# Patient Record
Sex: FEMALE | Race: WHITE | NOT HISPANIC OR LATINO | Employment: FULL TIME | URBAN - METROPOLITAN AREA
[De-identification: names, ages, dates, MRNs, and addresses within clinical notes are randomized per-mention and may not be internally consistent; named-entity substitution may affect disease eponyms.]

---

## 2022-06-13 LAB
HEPATITIS B SURFACE ANTIGEN (EXTERNAL): NEGATIVE
HIV1+2 AB SERPL QL IA: NEGATIVE
RUBELLA ANTIBODY IGG (EXTERNAL): NORMAL

## 2022-12-27 LAB — GROUP B STREPTOCOCCUS (EXTERNAL): NEGATIVE

## 2023-01-23 ENCOUNTER — ANESTHESIA (OUTPATIENT)
Dept: OBGYN | Facility: CLINIC | Age: 33
End: 2023-01-23
Payer: COMMERCIAL

## 2023-01-23 ENCOUNTER — HOSPITAL ENCOUNTER (INPATIENT)
Facility: CLINIC | Age: 33
LOS: 3 days | Discharge: HOME HEALTH | End: 2023-01-26
Attending: OBSTETRICS & GYNECOLOGY | Admitting: OBSTETRICS & GYNECOLOGY
Payer: COMMERCIAL

## 2023-01-23 ENCOUNTER — ANESTHESIA EVENT (OUTPATIENT)
Dept: OBGYN | Facility: CLINIC | Age: 33
End: 2023-01-23
Payer: COMMERCIAL

## 2023-01-23 DIAGNOSIS — Z34.90 ENCOUNTER FOR INDUCTION OF LABOR: ICD-10-CM

## 2023-01-23 LAB
ABO/RH(D): NORMAL
ALBUMIN MFR UR ELPH: <6 MG/DL (ref 1–14)
ALBUMIN SERPL BCG-MCNC: 3.7 G/DL (ref 3.5–5.2)
ALP SERPL-CCNC: 128 U/L (ref 35–104)
ALT SERPL W P-5'-P-CCNC: 15 U/L (ref 10–35)
ANION GAP SERPL CALCULATED.3IONS-SCNC: 13 MMOL/L (ref 7–15)
ANTIBODY SCREEN: NEGATIVE
AST SERPL W P-5'-P-CCNC: 21 U/L (ref 10–35)
BILIRUB SERPL-MCNC: 0.2 MG/DL
BUN SERPL-MCNC: 8 MG/DL (ref 6–20)
CALCIUM SERPL-MCNC: 8.4 MG/DL (ref 8.6–10)
CHLORIDE SERPL-SCNC: 103 MMOL/L (ref 98–107)
CREAT SERPL-MCNC: 0.47 MG/DL (ref 0.51–0.95)
CREAT UR-MCNC: 22.1 MG/DL
DEPRECATED HCO3 PLAS-SCNC: 20 MMOL/L (ref 22–29)
ERYTHROCYTE [DISTWIDTH] IN BLOOD BY AUTOMATED COUNT: 13.5 % (ref 10–15)
GFR SERPL CREATININE-BSD FRML MDRD: >90 ML/MIN/1.73M2
GLUCOSE SERPL-MCNC: 121 MG/DL (ref 70–99)
HCT VFR BLD AUTO: 35.9 % (ref 35–47)
HGB BLD-MCNC: 11.8 G/DL (ref 11.7–15.7)
MCH RBC QN AUTO: 29.9 PG (ref 26.5–33)
MCHC RBC AUTO-ENTMCNC: 32.9 G/DL (ref 31.5–36.5)
MCV RBC AUTO: 91 FL (ref 78–100)
PLATELET # BLD AUTO: 189 10E3/UL (ref 150–450)
POTASSIUM SERPL-SCNC: 3.7 MMOL/L (ref 3.4–5.3)
PROT SERPL-MCNC: 6.3 G/DL (ref 6.4–8.3)
PROT/CREAT 24H UR: NORMAL MG/G{CREAT}
RBC # BLD AUTO: 3.94 10E6/UL (ref 3.8–5.2)
SODIUM SERPL-SCNC: 136 MMOL/L (ref 136–145)
SPECIMEN EXPIRATION DATE: NORMAL
T PALLIDUM AB SER QL: NONREACTIVE
WBC # BLD AUTO: 10.9 10E3/UL (ref 4–11)

## 2023-01-23 PROCEDURE — 85027 COMPLETE CBC AUTOMATED: CPT | Performed by: OBSTETRICS & GYNECOLOGY

## 2023-01-23 PROCEDURE — 80053 COMPREHEN METABOLIC PANEL: CPT | Performed by: OBSTETRICS & GYNECOLOGY

## 2023-01-23 PROCEDURE — 36415 COLL VENOUS BLD VENIPUNCTURE: CPT | Performed by: OBSTETRICS & GYNECOLOGY

## 2023-01-23 PROCEDURE — 84156 ASSAY OF PROTEIN URINE: CPT | Performed by: OBSTETRICS & GYNECOLOGY

## 2023-01-23 PROCEDURE — 258N000003 HC RX IP 258 OP 636: Performed by: OBSTETRICS & GYNECOLOGY

## 2023-01-23 PROCEDURE — 250N000013 HC RX MED GY IP 250 OP 250 PS 637: Performed by: OBSTETRICS & GYNECOLOGY

## 2023-01-23 PROCEDURE — G0463 HOSPITAL OUTPT CLINIC VISIT: HCPCS

## 2023-01-23 PROCEDURE — 86901 BLOOD TYPING SEROLOGIC RH(D): CPT | Performed by: OBSTETRICS & GYNECOLOGY

## 2023-01-23 PROCEDURE — 370N000003 HC ANESTHESIA WARD SERVICE

## 2023-01-23 PROCEDURE — 86780 TREPONEMA PALLIDUM: CPT | Performed by: OBSTETRICS & GYNECOLOGY

## 2023-01-23 PROCEDURE — 120N000001 HC R&B MED SURG/OB

## 2023-01-23 RX ORDER — SODIUM CHLORIDE, SODIUM LACTATE, POTASSIUM CHLORIDE, CALCIUM CHLORIDE 600; 310; 30; 20 MG/100ML; MG/100ML; MG/100ML; MG/100ML
INJECTION, SOLUTION INTRAVENOUS CONTINUOUS
Status: DISCONTINUED | OUTPATIENT
Start: 2023-01-23 | End: 2023-01-24

## 2023-01-23 RX ORDER — MISOPROSTOL 200 UG/1
800 TABLET ORAL
Status: DISCONTINUED | OUTPATIENT
Start: 2023-01-23 | End: 2023-01-24

## 2023-01-23 RX ORDER — METHYLERGONOVINE MALEATE 0.2 MG/ML
200 INJECTION INTRAVENOUS
Status: DISCONTINUED | OUTPATIENT
Start: 2023-01-23 | End: 2023-01-24 | Stop reason: HOSPADM

## 2023-01-23 RX ORDER — TERBUTALINE SULFATE 1 MG/ML
0.25 INJECTION, SOLUTION SUBCUTANEOUS
Status: DISCONTINUED | OUTPATIENT
Start: 2023-01-23 | End: 2023-01-24 | Stop reason: HOSPADM

## 2023-01-23 RX ORDER — METOCLOPRAMIDE 10 MG/1
10 TABLET ORAL EVERY 6 HOURS PRN
Status: DISCONTINUED | OUTPATIENT
Start: 2023-01-23 | End: 2023-01-24 | Stop reason: HOSPADM

## 2023-01-23 RX ORDER — CARBOPROST TROMETHAMINE 250 UG/ML
250 INJECTION, SOLUTION INTRAMUSCULAR
Status: DISCONTINUED | OUTPATIENT
Start: 2023-01-23 | End: 2023-01-24 | Stop reason: HOSPADM

## 2023-01-23 RX ORDER — NALOXONE HYDROCHLORIDE 0.4 MG/ML
0.2 INJECTION, SOLUTION INTRAMUSCULAR; INTRAVENOUS; SUBCUTANEOUS
Status: DISCONTINUED | OUTPATIENT
Start: 2023-01-23 | End: 2023-01-24 | Stop reason: HOSPADM

## 2023-01-23 RX ORDER — SODIUM CHLORIDE, SODIUM LACTATE, POTASSIUM CHLORIDE, CALCIUM CHLORIDE 600; 310; 30; 20 MG/100ML; MG/100ML; MG/100ML; MG/100ML
INJECTION, SOLUTION INTRAVENOUS CONTINUOUS PRN
Status: DISCONTINUED | OUTPATIENT
Start: 2023-01-23 | End: 2023-01-24 | Stop reason: HOSPADM

## 2023-01-23 RX ORDER — FENTANYL CITRATE 50 UG/ML
100 INJECTION, SOLUTION INTRAMUSCULAR; INTRAVENOUS
Status: DISCONTINUED | OUTPATIENT
Start: 2023-01-23 | End: 2023-01-24

## 2023-01-23 RX ORDER — PROCHLORPERAZINE 25 MG
25 SUPPOSITORY, RECTAL RECTAL EVERY 12 HOURS PRN
Status: DISCONTINUED | OUTPATIENT
Start: 2023-01-23 | End: 2023-01-24 | Stop reason: HOSPADM

## 2023-01-23 RX ORDER — CITRIC ACID/SODIUM CITRATE 334-500MG
30 SOLUTION, ORAL ORAL
Status: DISCONTINUED | OUTPATIENT
Start: 2023-01-23 | End: 2023-01-24 | Stop reason: HOSPADM

## 2023-01-23 RX ORDER — KETOROLAC TROMETHAMINE 30 MG/ML
30 INJECTION, SOLUTION INTRAMUSCULAR; INTRAVENOUS
Status: DISCONTINUED | OUTPATIENT
Start: 2023-01-23 | End: 2023-01-26 | Stop reason: HOSPADM

## 2023-01-23 RX ORDER — HYDROXYZINE HYDROCHLORIDE 50 MG/1
50 TABLET, FILM COATED ORAL
Status: DISCONTINUED | OUTPATIENT
Start: 2023-01-23 | End: 2023-01-23

## 2023-01-23 RX ORDER — ONDANSETRON 4 MG/1
4 TABLET, ORALLY DISINTEGRATING ORAL EVERY 6 HOURS PRN
Status: DISCONTINUED | OUTPATIENT
Start: 2023-01-23 | End: 2023-01-23 | Stop reason: HOSPADM

## 2023-01-23 RX ORDER — METOCLOPRAMIDE HYDROCHLORIDE 5 MG/ML
10 INJECTION INTRAMUSCULAR; INTRAVENOUS EVERY 6 HOURS PRN
Status: DISCONTINUED | OUTPATIENT
Start: 2023-01-23 | End: 2023-01-24 | Stop reason: HOSPADM

## 2023-01-23 RX ORDER — OXYTOCIN/0.9 % SODIUM CHLORIDE 30/500 ML
1-24 PLASTIC BAG, INJECTION (ML) INTRAVENOUS CONTINUOUS
Status: DISCONTINUED | OUTPATIENT
Start: 2023-01-24 | End: 2023-01-24 | Stop reason: HOSPADM

## 2023-01-23 RX ORDER — NALOXONE HYDROCHLORIDE 0.4 MG/ML
0.4 INJECTION, SOLUTION INTRAMUSCULAR; INTRAVENOUS; SUBCUTANEOUS
Status: DISCONTINUED | OUTPATIENT
Start: 2023-01-23 | End: 2023-01-24 | Stop reason: HOSPADM

## 2023-01-23 RX ORDER — OXYTOCIN/0.9 % SODIUM CHLORIDE 30/500 ML
100-340 PLASTIC BAG, INJECTION (ML) INTRAVENOUS CONTINUOUS PRN
Status: DISCONTINUED | OUTPATIENT
Start: 2023-01-23 | End: 2023-01-26 | Stop reason: HOSPADM

## 2023-01-23 RX ORDER — ONDANSETRON 4 MG/1
4 TABLET, ORALLY DISINTEGRATING ORAL EVERY 6 HOURS PRN
Status: DISCONTINUED | OUTPATIENT
Start: 2023-01-23 | End: 2023-01-24 | Stop reason: ALTCHOICE

## 2023-01-23 RX ORDER — EPHEDRINE SULFATE 50 MG/ML
5 INJECTION, SOLUTION INTRAMUSCULAR; INTRAVENOUS; SUBCUTANEOUS
Status: DISCONTINUED | OUTPATIENT
Start: 2023-01-23 | End: 2023-01-24 | Stop reason: HOSPADM

## 2023-01-23 RX ORDER — PROCHLORPERAZINE MALEATE 10 MG
10 TABLET ORAL EVERY 6 HOURS PRN
Status: DISCONTINUED | OUTPATIENT
Start: 2023-01-23 | End: 2023-01-23 | Stop reason: HOSPADM

## 2023-01-23 RX ORDER — NALBUPHINE HYDROCHLORIDE 10 MG/ML
2.5-5 INJECTION, SOLUTION INTRAMUSCULAR; INTRAVENOUS; SUBCUTANEOUS EVERY 6 HOURS PRN
Status: DISCONTINUED | OUTPATIENT
Start: 2023-01-23 | End: 2023-01-24 | Stop reason: ALTCHOICE

## 2023-01-23 RX ORDER — METOCLOPRAMIDE 10 MG/1
10 TABLET ORAL EVERY 6 HOURS PRN
Status: DISCONTINUED | OUTPATIENT
Start: 2023-01-23 | End: 2023-01-23 | Stop reason: HOSPADM

## 2023-01-23 RX ORDER — OXYTOCIN 10 [USP'U]/ML
10 INJECTION, SOLUTION INTRAMUSCULAR; INTRAVENOUS
Status: DISCONTINUED | OUTPATIENT
Start: 2023-01-23 | End: 2023-01-26 | Stop reason: HOSPADM

## 2023-01-23 RX ORDER — OXYTOCIN/0.9 % SODIUM CHLORIDE 30/500 ML
340 PLASTIC BAG, INJECTION (ML) INTRAVENOUS CONTINUOUS PRN
Status: DISCONTINUED | OUTPATIENT
Start: 2023-01-23 | End: 2023-01-24 | Stop reason: HOSPADM

## 2023-01-23 RX ORDER — IBUPROFEN 800 MG/1
800 TABLET, FILM COATED ORAL
Status: DISCONTINUED | OUTPATIENT
Start: 2023-01-23 | End: 2023-01-26 | Stop reason: HOSPADM

## 2023-01-23 RX ORDER — MISOPROSTOL 200 UG/1
400 TABLET ORAL
Status: DISCONTINUED | OUTPATIENT
Start: 2023-01-23 | End: 2023-01-24

## 2023-01-23 RX ORDER — ONDANSETRON 2 MG/ML
4 INJECTION INTRAMUSCULAR; INTRAVENOUS EVERY 6 HOURS PRN
Status: DISCONTINUED | OUTPATIENT
Start: 2023-01-23 | End: 2023-01-24 | Stop reason: ALTCHOICE

## 2023-01-23 RX ORDER — PROCHLORPERAZINE 25 MG
25 SUPPOSITORY, RECTAL RECTAL EVERY 12 HOURS PRN
Status: DISCONTINUED | OUTPATIENT
Start: 2023-01-23 | End: 2023-01-23 | Stop reason: HOSPADM

## 2023-01-23 RX ORDER — MISOPROSTOL 100 UG/1
25 TABLET ORAL
Status: DISCONTINUED | OUTPATIENT
Start: 2023-01-23 | End: 2023-01-23

## 2023-01-23 RX ORDER — PROCHLORPERAZINE MALEATE 10 MG
10 TABLET ORAL EVERY 6 HOURS PRN
Status: DISCONTINUED | OUTPATIENT
Start: 2023-01-23 | End: 2023-01-24 | Stop reason: HOSPADM

## 2023-01-23 RX ORDER — MORPHINE SULFATE 10 MG/ML
10 INJECTION, SOLUTION INTRAMUSCULAR; INTRAVENOUS
Status: DISCONTINUED | OUTPATIENT
Start: 2023-01-23 | End: 2023-01-23

## 2023-01-23 RX ORDER — TRANEXAMIC ACID 10 MG/ML
1 INJECTION, SOLUTION INTRAVENOUS EVERY 30 MIN PRN
Status: DISCONTINUED | OUTPATIENT
Start: 2023-01-23 | End: 2023-01-24 | Stop reason: HOSPADM

## 2023-01-23 RX ORDER — ONDANSETRON 2 MG/ML
4 INJECTION INTRAMUSCULAR; INTRAVENOUS EVERY 6 HOURS PRN
Status: DISCONTINUED | OUTPATIENT
Start: 2023-01-23 | End: 2023-01-23 | Stop reason: HOSPADM

## 2023-01-23 RX ORDER — METOCLOPRAMIDE HYDROCHLORIDE 5 MG/ML
10 INJECTION INTRAMUSCULAR; INTRAVENOUS EVERY 6 HOURS PRN
Status: DISCONTINUED | OUTPATIENT
Start: 2023-01-23 | End: 2023-01-23 | Stop reason: HOSPADM

## 2023-01-23 RX ORDER — OXYTOCIN 10 [USP'U]/ML
10 INJECTION, SOLUTION INTRAMUSCULAR; INTRAVENOUS
Status: DISCONTINUED | OUTPATIENT
Start: 2023-01-23 | End: 2023-01-24 | Stop reason: HOSPADM

## 2023-01-23 RX ADMIN — MISOPROSTOL 25 MCG: 100 TABLET ORAL at 11:46

## 2023-01-23 RX ADMIN — MISOPROSTOL 25 MCG: 100 TABLET ORAL at 13:46

## 2023-01-23 RX ADMIN — MISOPROSTOL 25 MCG: 100 TABLET ORAL at 18:56

## 2023-01-23 RX ADMIN — MISOPROSTOL 25 MCG: 100 TABLET ORAL at 15:45

## 2023-01-23 RX ADMIN — SODIUM CHLORIDE, POTASSIUM CHLORIDE, SODIUM LACTATE AND CALCIUM CHLORIDE 500 ML: 600; 310; 30; 20 INJECTION, SOLUTION INTRAVENOUS at 20:15

## 2023-01-23 RX ADMIN — MISOPROSTOL 25 MCG: 100 TABLET ORAL at 21:29

## 2023-01-23 ASSESSMENT — ACTIVITIES OF DAILY LIVING (ADL)
ADLS_ACUITY_SCORE: 20
CONCENTRATING,_REMEMBERING_OR_MAKING_DECISIONS_DIFFICULTY: NO
WALKING_OR_CLIMBING_STAIRS_DIFFICULTY: NO
DRESSING/BATHING_DIFFICULTY: NO
DIFFICULTY_EATING/SWALLOWING: NO
ADLS_ACUITY_SCORE: 20
TOILETING_ISSUES: NO
ADLS_ACUITY_SCORE: 20
DOING_ERRANDS_INDEPENDENTLY_DIFFICULTY: NO
DIFFICULTY_COMMUNICATING: NO
ADLS_ACUITY_SCORE: 20
FALL_HISTORY_WITHIN_LAST_SIX_MONTHS: NO
ADLS_ACUITY_SCORE: 31
ADLS_ACUITY_SCORE: 20
ADLS_ACUITY_SCORE: 20
CHANGE_IN_FUNCTIONAL_STATUS_SINCE_ONSET_OF_CURRENT_ILLNESS/INJURY: NO
HEARING_DIFFICULTY_OR_DEAF: NO

## 2023-01-23 NOTE — PLAN OF CARE
Data: Patient presented to [facility]: 2023  9:49 AM.  Reason for maternal/fetal assessment is elevated blood pressures. Patient reports that she was in clinic today and had mildly elevated BP and provider sent her for WAYNE cui. Pt stated she had a HA yesterday that resolved on its own.  Patient is a .  Prenatal record reviewed. Pregnancy has been uncomplicated..  Gestational Age Unknown. VSS. Fetal movement active. Patient denies uterine contractions, abdominal pain, pelvic pressure, headache, visual disturbances. Support person is not present.   Action: Verbal consent for EFM. Triage assessment completed. Bill of rights reviewed.  Response: Patient verbalized agreement with plan. Will contact Dr Gabriela Pulliam with update and for further orders.

## 2023-01-23 NOTE — PROVIDER NOTIFICATION
01/23/23 1043   Provider Notification   Provider Name/Title Dr Pulliam   Method of Notification Phone   Request Evaluate - Remote   Notification Reason Status Update     TC from Dr Pulliam regarding pt, MD aware of two BP in 130s/90, pending labs, and SVE at clinic this morning was 1/70/-3, MD gave TORB to admit pt for IOL for GHTN, MD will write orders for cervical ripening.

## 2023-01-23 NOTE — H&P
"Labor & Delivery History & Physical  Patient Name:  Maddison Renae   MRN:  [id number]  Age:  33 year old    YOB: 1990      Subjective:    Maddison Renae is a 33 year old  at 40+3 who presents for elevated BP in clinic (135/91).  Patient reports headache that started yesterday, but resolved without meds; she says she \"feels off.\"    Patient denies leaking of fluid or vaginal bleeding.  Fetal Movement: present.       Prenatal care with Brigitte Soriano ([facility] [address])   Prenatal care complicated by:  - O pos  - GBS neg  - ASCUS, HPV pos: colpo PP  - ADHD        Pregnancy Episode Problems (from 23 to present)     No problems associated with this episode.        OB History    Para Term  AB Living   1 0 0 0 0 0   SAB IAB Ectopic Multiple Live Births   0 0 0 0 0      # Outcome Date GA Lbr Raul/2nd Weight Sex Delivery Anes PTL Lv   1 Current              No past medical history on file.  Past Surgical History:   Procedure Laterality Date     AS REMOVAL OF TONSILS,12+ Y/O       Social History     Tobacco Use     Smoking status: Never     Smokeless tobacco: Never   Substance Use Topics     Alcohol use: Not Currently     Drug use: Not Currently      History   Drug Use Unknown     Family History   Problem Relation Age of Onset     Breast Cancer Maternal Grandmother      Diabetes Paternal Grandmother      [unfilled]   No medications prior to admission.       There is no immunization history on file for this patient.    Review of Systems - NEGATIVE FOR  Fevers  Chills  Headache  Visual changes  Ear pain  Sore throat  Cough  Shortness of breath  Chest pain  Palpitations  Constipation  Diarrhea  Vomiting  Bloody stools  Hematuria  Dysuria  Muscle weakness  Gait disturbance    Objective:  Temp:  [98.3  F (36.8  C)] 98.3  F (36.8  C)  Pulse:  [80] 80  Resp:  [16] 16  BP: (130-132)/(90-93) 130/93  200 lbs 0 oz      General: General appearance: alert, well appearing, and in no " distress.   Lungs:   unlabored   Heart:  regular rate and rhythm   Abdomen: Gravid, nontender between contractions   [place]: Contraction Frequency (min): none   FHT: Baseline 125 BPM   Fetal heart variability:moderate  Fetal heart rate accelerations:  Present 15 x 15  Fetal heart rate decelerations: none  Fetal heart rate interpretation:  Category I   Speculum: NA    Cervix: Dilation:   1  Effacement:     70  Station:             -2  Exam done in office prior to hospital arrival    Extremities: Trace to 1+ edema bilateral, symmetric edema; neg Aubree's sign      Lab Review:    Hemoglobin   Date Value Ref Range Status   2023 11.8 11.7 - 15.7 g/dL Final     Hematocrit   Date Value Ref Range Status   2023 35.9 35.0 - 47.0 % Final         Assessment  Maddison Renae is a 33 year old  female at 40+3 who presents with GHTN vs preE without SF.  Divert from [place].      Plan  - GHTN:   - Dx based on mild range Bps and normal P:C ratio.     - No s/sx of preE.  No indication for PO/IV antihypertensives or mag.  If sustained severe range BPs, would treat with IV labetalol and start IV mag for sz ppx.  - IOL: Given dx and gestational age, will proceed with IOL.     - CR to start now with PO cytotec.  Discussed possible Cervical Ripening balloon if no cervical change later today. She would be amenable.    - Epidural upon request  - FHT: cat I.  Cont CEFM.  - ASCUS, HPV pos: colpo PP with primary GYN  - ADHD  - O pos  - GBS neg  - PP Contraception: will discuss later  - Dispo: Anticipate

## 2023-01-23 NOTE — PROVIDER NOTIFICATION
01/23/23 1618   Provider Notification   Provider Name/Title Dr Pulliam   Method of Notification Phone   Request Evaluate - Remote   Notification Reason SVE     TC to Dr Pulliam and updated her on sve 1-1.5/70/-2, cervix soft and posterior, bullock score of 6. MD stated she will be by shortly to do posadas balloon.

## 2023-01-23 NOTE — PROVIDER NOTIFICATION
01/23/23 1551   Provider Notification   Provider Name/Title Dr Pulliam   Method of Notification Phone   Request Evaluate - Remote   Notification Reason Status Update     TC from MD giving orders to do sve @ [phone] and update.

## 2023-01-23 NOTE — PROVIDER NOTIFICATION
01/23/23 1631   Provider Notification   Provider Name/Title Dr Pulliam   Method of Notification At Bedside   Notification Reason SVE     MD bedside, posadas cath inserted with 80ml inserted, MD gave VORB to take posadas to leg with traction, tug posadas q few hrs and to remove at 0700 on 1/24.

## 2023-01-23 NOTE — PROGRESS NOTES
Patient doing well with no complaints.  Does not feel any contractions.  No LOF or VB.  +FM.  She is amenable to Salvador balloon placement.     FHT: 125/mod/+accel/-decel  [place]: irregular  SVE: /-2, cephalic    Salvador Ripening Balloon insertion     Reviewed risks of procedure with patient. Risks include but not limited to pain, bleeding, infection, rupture of membranes, fetal heart rate decelerations.  She is aware that the balloon may stay in for up to 24 hours. She understands and agrees.  FHT prior to insertion was cat I.  SVE as above.     Speculum was placed with good visualization of cervix.  Salvador catheter was placed under direct visualization.  Balloon was inflated with 50cc of sterile saline.  Appropriate location confirmed. Balloon filled with additional 30cc.  Total volume in balloon is 80cc.  Patient tolerated well.  FHT after insertion was cat I.  If contractions space out, will start low dose pitocin.      Assessment  Maddison Renae is a 33 year old  female at 40+3 who presents with GHTN vs preE without SF.  Divert from [place].     Plan  - GHTN:              - Dx based on mild range Bps and normal P:C ratio.                - No s/sx of preE.  No indication for PO/IV antihypertensives or mag.  If sustained severe range BPs, would treat with IV labetalol and start IV mag for sz ppx.  - CR: s/p cytotec x3 and now s/p uncomplicated placement of Salvador ripening balloon.  Will continue cytotec PO.              - Epidural upon request  - FHT: cat I.  Cont CEFM.  - ASCUS, HPV pos: colpo PP with primary GYN  - ADHD  - O pos  - GBS neg  - PP Contraception: will discuss later  - Dispo: Anticipate

## 2023-01-24 PROCEDURE — 3E0P7VZ INTRODUCTION OF HORMONE INTO FEMALE REPRODUCTIVE, VIA NATURAL OR ARTIFICIAL OPENING: ICD-10-PCS | Performed by: OBSTETRICS & GYNECOLOGY

## 2023-01-24 PROCEDURE — 250N000011 HC RX IP 250 OP 636: Performed by: ANESTHESIOLOGY

## 2023-01-24 PROCEDURE — 250N000011 HC RX IP 250 OP 636: Performed by: OBSTETRICS & GYNECOLOGY

## 2023-01-24 PROCEDURE — 250N000013 HC RX MED GY IP 250 OP 250 PS 637: Performed by: OBSTETRICS & GYNECOLOGY

## 2023-01-24 PROCEDURE — 3E033VJ INTRODUCTION OF OTHER HORMONE INTO PERIPHERAL VEIN, PERCUTANEOUS APPROACH: ICD-10-PCS | Performed by: OBSTETRICS & GYNECOLOGY

## 2023-01-24 PROCEDURE — 258N000003 HC RX IP 258 OP 636: Performed by: OBSTETRICS & GYNECOLOGY

## 2023-01-24 PROCEDURE — 0HQ9XZZ REPAIR PERINEUM SKIN, EXTERNAL APPROACH: ICD-10-PCS | Performed by: OBSTETRICS & GYNECOLOGY

## 2023-01-24 PROCEDURE — 10907ZC DRAINAGE OF AMNIOTIC FLUID, THERAPEUTIC FROM PRODUCTS OF CONCEPTION, VIA NATURAL OR ARTIFICIAL OPENING: ICD-10-PCS | Performed by: OBSTETRICS & GYNECOLOGY

## 2023-01-24 PROCEDURE — 120N000001 HC R&B MED SURG/OB

## 2023-01-24 PROCEDURE — 00HU33Z INSERTION OF INFUSION DEVICE INTO SPINAL CANAL, PERCUTANEOUS APPROACH: ICD-10-PCS | Performed by: ANESTHESIOLOGY

## 2023-01-24 PROCEDURE — 3E0R3BZ INTRODUCTION OF ANESTHETIC AGENT INTO SPINAL CANAL, PERCUTANEOUS APPROACH: ICD-10-PCS | Performed by: ANESTHESIOLOGY

## 2023-01-24 PROCEDURE — 722N000001 HC LABOR CARE VAGINAL DELIVERY SINGLE

## 2023-01-24 PROCEDURE — 250N000009 HC RX 250: Performed by: OBSTETRICS & GYNECOLOGY

## 2023-01-24 RX ORDER — NALOXONE HYDROCHLORIDE 0.4 MG/ML
0.4 INJECTION, SOLUTION INTRAMUSCULAR; INTRAVENOUS; SUBCUTANEOUS
Status: DISCONTINUED | OUTPATIENT
Start: 2023-01-24 | End: 2023-01-26 | Stop reason: HOSPADM

## 2023-01-24 RX ORDER — OXYCODONE HYDROCHLORIDE 5 MG/1
5 TABLET ORAL EVERY 4 HOURS PRN
Status: DISCONTINUED | OUTPATIENT
Start: 2023-01-24 | End: 2023-01-26 | Stop reason: HOSPADM

## 2023-01-24 RX ORDER — BISACODYL 10 MG
10 SUPPOSITORY, RECTAL RECTAL DAILY PRN
Status: DISCONTINUED | OUTPATIENT
Start: 2023-01-24 | End: 2023-01-26 | Stop reason: HOSPADM

## 2023-01-24 RX ORDER — ONDANSETRON 2 MG/ML
4 INJECTION INTRAMUSCULAR; INTRAVENOUS EVERY 6 HOURS PRN
Status: DISCONTINUED | OUTPATIENT
Start: 2023-01-24 | End: 2023-01-24 | Stop reason: HOSPADM

## 2023-01-24 RX ORDER — NALOXONE HYDROCHLORIDE 0.4 MG/ML
0.2 INJECTION, SOLUTION INTRAMUSCULAR; INTRAVENOUS; SUBCUTANEOUS
Status: DISCONTINUED | OUTPATIENT
Start: 2023-01-24 | End: 2023-01-26 | Stop reason: HOSPADM

## 2023-01-24 RX ORDER — FENTANYL CITRATE 50 UG/ML
INJECTION, SOLUTION INTRAMUSCULAR; INTRAVENOUS
Status: COMPLETED | OUTPATIENT
Start: 2023-01-24 | End: 2023-01-24

## 2023-01-24 RX ORDER — MISOPROSTOL 200 UG/1
800 TABLET ORAL
Status: DISCONTINUED | OUTPATIENT
Start: 2023-01-24 | End: 2023-01-26 | Stop reason: HOSPADM

## 2023-01-24 RX ORDER — FENTANYL CITRATE 50 UG/ML
100 INJECTION, SOLUTION INTRAMUSCULAR; INTRAVENOUS ONCE
Status: COMPLETED | OUTPATIENT
Start: 2023-01-24 | End: 2023-01-24

## 2023-01-24 RX ORDER — ACETAMINOPHEN 325 MG/1
650 TABLET ORAL EVERY 4 HOURS PRN
Status: DISCONTINUED | OUTPATIENT
Start: 2023-01-24 | End: 2023-01-26 | Stop reason: HOSPADM

## 2023-01-24 RX ORDER — OXYTOCIN 10 [USP'U]/ML
10 INJECTION, SOLUTION INTRAMUSCULAR; INTRAVENOUS
Status: DISCONTINUED | OUTPATIENT
Start: 2023-01-24 | End: 2023-01-26 | Stop reason: HOSPADM

## 2023-01-24 RX ORDER — HYDROCORTISONE 25 MG/G
CREAM TOPICAL 3 TIMES DAILY PRN
Status: DISCONTINUED | OUTPATIENT
Start: 2023-01-24 | End: 2023-01-26 | Stop reason: HOSPADM

## 2023-01-24 RX ORDER — CARBOPROST TROMETHAMINE 250 UG/ML
250 INJECTION, SOLUTION INTRAMUSCULAR
Status: DISCONTINUED | OUTPATIENT
Start: 2023-01-24 | End: 2023-01-26 | Stop reason: HOSPADM

## 2023-01-24 RX ORDER — NALBUPHINE HYDROCHLORIDE 10 MG/ML
2.5-5 INJECTION, SOLUTION INTRAMUSCULAR; INTRAVENOUS; SUBCUTANEOUS EVERY 6 HOURS PRN
Status: ACTIVE | OUTPATIENT
Start: 2023-01-24 | End: 2023-01-26

## 2023-01-24 RX ORDER — TRANEXAMIC ACID 10 MG/ML
1 INJECTION, SOLUTION INTRAVENOUS EVERY 30 MIN PRN
Status: DISCONTINUED | OUTPATIENT
Start: 2023-01-24 | End: 2023-01-26 | Stop reason: HOSPADM

## 2023-01-24 RX ORDER — ONDANSETRON 4 MG/1
4 TABLET, ORALLY DISINTEGRATING ORAL EVERY 6 HOURS PRN
Status: DISCONTINUED | OUTPATIENT
Start: 2023-01-24 | End: 2023-01-24 | Stop reason: HOSPADM

## 2023-01-24 RX ORDER — EPHEDRINE SULFATE 50 MG/ML
5 INJECTION, SOLUTION INTRAMUSCULAR; INTRAVENOUS; SUBCUTANEOUS
Status: DISCONTINUED | OUTPATIENT
Start: 2023-01-24 | End: 2023-01-24 | Stop reason: HOSPADM

## 2023-01-24 RX ORDER — IBUPROFEN 800 MG/1
800 TABLET, FILM COATED ORAL EVERY 6 HOURS PRN
Status: DISCONTINUED | OUTPATIENT
Start: 2023-01-24 | End: 2023-01-26 | Stop reason: HOSPADM

## 2023-01-24 RX ORDER — DOCUSATE SODIUM 100 MG/1
100 CAPSULE, LIQUID FILLED ORAL DAILY
Status: DISCONTINUED | OUTPATIENT
Start: 2023-01-24 | End: 2023-01-26 | Stop reason: HOSPADM

## 2023-01-24 RX ORDER — MISOPROSTOL 200 UG/1
400 TABLET ORAL
Status: DISCONTINUED | OUTPATIENT
Start: 2023-01-24 | End: 2023-01-26 | Stop reason: HOSPADM

## 2023-01-24 RX ORDER — METHYLERGONOVINE MALEATE 0.2 MG/ML
200 INJECTION INTRAVENOUS
Status: DISCONTINUED | OUTPATIENT
Start: 2023-01-24 | End: 2023-01-26 | Stop reason: HOSPADM

## 2023-01-24 RX ORDER — MODIFIED LANOLIN
OINTMENT (GRAM) TOPICAL
Status: DISCONTINUED | OUTPATIENT
Start: 2023-01-24 | End: 2023-01-26 | Stop reason: HOSPADM

## 2023-01-24 RX ORDER — OXYTOCIN/0.9 % SODIUM CHLORIDE 30/500 ML
340 PLASTIC BAG, INJECTION (ML) INTRAVENOUS CONTINUOUS PRN
Status: DISCONTINUED | OUTPATIENT
Start: 2023-01-24 | End: 2023-01-26 | Stop reason: HOSPADM

## 2023-01-24 RX ADMIN — SODIUM CHLORIDE, POTASSIUM CHLORIDE, SODIUM LACTATE AND CALCIUM CHLORIDE: 600; 310; 30; 20 INJECTION, SOLUTION INTRAVENOUS at 10:40

## 2023-01-24 RX ADMIN — Medication: at 11:07

## 2023-01-24 RX ADMIN — FENTANYL CITRATE 100 MCG: 50 INJECTION, SOLUTION INTRAMUSCULAR; INTRAVENOUS at 11:01

## 2023-01-24 RX ADMIN — ACETAMINOPHEN 650 MG: 325 TABLET, FILM COATED ORAL at 22:41

## 2023-01-24 RX ADMIN — FENTANYL CITRATE 100 MCG: 50 INJECTION, SOLUTION INTRAMUSCULAR; INTRAVENOUS at 10:24

## 2023-01-24 RX ADMIN — DOCUSATE SODIUM 100 MG: 100 CAPSULE, LIQUID FILLED ORAL at 20:36

## 2023-01-24 RX ADMIN — Medication 2 MILLI-UNITS/MIN: at 08:47

## 2023-01-24 RX ADMIN — IBUPROFEN 800 MG: 800 TABLET, FILM COATED ORAL at 19:03

## 2023-01-24 RX ADMIN — FENTANYL CITRATE 100 MCG: 50 INJECTION, SOLUTION INTRAMUSCULAR; INTRAVENOUS at 11:02

## 2023-01-24 ASSESSMENT — ACTIVITIES OF DAILY LIVING (ADL)
ADLS_ACUITY_SCORE: 20

## 2023-01-24 NOTE — PROVIDER NOTIFICATION
01/24/23 0618   Provider Notification   Provider Name/Title Dr. Pulliam   Notification Reason Status Update     Dr. Pulliam called unit for status update.  Updated last SVE 6/70/-2, BOW intact, FHR overall Cat. I, UC's q2-5 min, UC's now palpate moderate.  Pt coping well, no medication intervention for pain at this time.  Dr. Pulliam verbalized understanding, no new orders at this time.  Will continue to monitor until transfer of care.

## 2023-01-24 NOTE — PROVIDER NOTIFICATION
01/24/23 0823   Provider Notification   Provider Name/Title Dr. Antony   Method of Notification Phone   Request Evaluate-Remote   Notification Reason Status Update     Cervix 6 cm at 0500. Patient having irregular contractions. Dr. Antony updated per phone. Plan per provider: check cervix and begin Pitocin induction.

## 2023-01-24 NOTE — PROVIDER NOTIFICATION
01/23/23 1476   Provider Notification   Provider Name/Title Octavia   Method of Notification Phone   Request Evaluate - Remote   Notification Reason Labor Status;Uterine Activity;SVE     Pt got up to void and posadas catheter fell out in toilet- bulb intact. VE now 3-4/75/-2 more mid than posterior. Pt states she is coping with UC's, denies any pain med intervention. Cat 1 strip. Paged Dr Pulliam to update. TORB to let pt labor at this time. If nothing changes between now and 0100- recheck VE at that time and if no cervical change -start pitocin augmentation at 2mu and build by 2mu. Pain interventions/epidural ok at any time. If epidural before 0100-start pit sooner.

## 2023-01-24 NOTE — PROVIDER NOTIFICATION
01/24/23 0118   Provider Notification   Provider Name/Title Dr. Pulliam   Method of Notification Electronic Page   Request Evaluate - Remote   Notification Reason SVE   Dr. Pulliam updated on pt status.  SVE 4-5/70/-2, some change since 2130.  UC's q2-5 min, palpate mild, pt resting comfortably.  Dr. Pulliam verbalized understanding, TORB to wait until 0500 to recheck SVE if no changes in pt status, start pitocin augmentation at that time if SVE unchanged.  Will update pt on POC and continue to monitor.

## 2023-01-24 NOTE — PLAN OF CARE
"Assumed cares [id number]. Pt using nitrous for discomfort. Cat 1 strip, UC's and irritability at 1900 and at 2000 requests Fentanyl \"just one dose\"per pt. Education done re: what to anticipate in labor and labor progression, pain interventions/options, positioning. Pt states she is very nervous\"just wants this done\" but after discussion feels more settled and understanding of process. Pt has not been up to void since balloon placement- encouraged her to void and will start LR flush to attempt to flush out irritability and help pt with just managing contractions.Pt decided to wait on Fentanyl at this time  "

## 2023-01-24 NOTE — PROGRESS NOTES
Pt comfortable with epidural  toco ctxs q 2-3 min   bpm, mod, a +, d-  SVE 10/100/+1    Ok to start second stage of labor  Dr. Clovis Antony  [phone]

## 2023-01-24 NOTE — L&D DELIVERY NOTE
Maddison Renae is a 33 year old  who was admitted at 40w4d for GA .  Pregnancy was complicated by gHTN, ADHD. Under epidural anesthesia  the head was delivered in the OA position, a loose nuchal cord was noted and baby was delivered through. No shoulder dystocia was noted . Delayed cord clamp was done. A viable female infant was delivered at 1635 hrs with APGARs of 9 and 9 respectively.  Spontaneously delivery of an intact placenta with a 3-V cord ensued shortly thereafter.  She received 30 units of IV pitocin as a uterotonic agent.  Exam of the perineum revealed a 1st degree laceration which was repaired in standard fashion using a 3-0 vicryl suture. Right labial laceration noted, hemostatic, re-approximated with a single interrupted stitch using a 3-0 vicryl suture.  QBL 81 cc.      Dr. Clovis Antony  [phone]      Delivery Summary    Maddison Renae MRN# [id number]   Age: 33 year old YOB: 1990          Mery Renae-Maddison [[id number]]    Labor Event Times    Labor onset date: 23 Onset time:  9:00 AM   Dilation complete date: 23 Complete time:  4:00 PM   Start pushing date/time: 2023 1608      Labor Events    Labor Type: Induction/Cervical ripening  Predominate monitoring during 1st stage: continuous electronic fetal monitoring     Rupture date/time: 23 0930   Rupture type: Artificial Rupture of Membranes  Fluid color: Clear     Induction: Misoprostol, Mechanical ripening agent  Induction date/time:     Cervical ripening date/time: 23    Indications for induction: Hypertension     Augmentation: AROM  Indications for augmentation: Ineffective Contraction Pattern     Delivery/Placenta Date and Time    Delivery Date: 23 Delivery Time:  4:35 PM   Placenta Date/Time: 2023  4:39 PM  Oxytocin given at the time of delivery: after delivery of placenta  Delivering clinician: Vicki Antony MD   Other personnel present at delivery:  Provider Role    Bouchra Yap RN Houwman, Sally E, RN          Vaginal Counts     Initial count performed by 2 team members:  Two Team Members   Marysol Antony       Needles Suture Needles Sponges (RETIRED) Instruments   Initial counts 2  5    Added to count  1     Relief counts       Final counts 2 1 5          Placed during labor Accounted for at the end of labor   FSE No NA   IUPC No NA   Cervidil No NA              Final count performed by 2 team members:  Two Team Members   Andreia Antony         Apgars    Living status: Living   1 Minute 5 Minute 10 Minute 15 Minute 20 Minute   Skin color: 1  1       Heart rate: 2  2       Reflex irritability: 2  2       Muscle tone: 2  2       Respiratory effort: 2  2       Total: 9  9       Apgars assigned by: MARYSOL SHELLEY     Cord    Vessels: 3 Vessels    Cord Complications: None               Cord Blood Disposition: Lab    Gases Sent?: No    Delayed cord clamping?: Yes    Cord Clamping Delay (seconds): 31-60 seconds    Stem cell collection?: No       [place] Resuscitation    Methods: None     Labor Events and Shoulder Dystocia    Fetal Tracing Prior to Delivery: Category 1  Shoulder dystocia present?: Neg     Delivery (Maternal) (Provider to Complete) ([id number])    Episiotomy: None  Perineal lacerations: 1st Repaired?: Yes   Labial laceration: right Repaired?: Yes   Repair suture: 3-0 Vicryl  Genital tract inspection done: Pos     Blood Loss  Mother: Maddison Renae R #[id number]   Start of Mother's Information    Delivery Blood Loss  23 0900 - 23 1657    Delivery QBL (mL) Hospital Encounter 81 mL    Total  81 mL         End of Mother's Information  Mother: Maddison Renae #[id number]          Delivery - Provider to Complete ([id number])    Delivering clinician: Vicki Antony MD  Attempted Delivery Types (Choose all that apply): Spontaneous Vaginal Delivery  Delivery Type (Choose the 1 that will go to the Birth History): Vaginal, Spontaneous                    Other personnel:  Provider Role   Bouchra Yap RN Houwman, Sally E, RN                 Placenta    Date/Time: 1/24/2023  4:39 PM  Removal: Spontaneous  Disposition: Hospital disposal           Anesthesia    Method: Nitrous Oxide, Epidural, INTRAVENOUS                 Presentation and Position    Presentation: Vertex    Position: Middle Occiput Anterior                 Vicki Antony MD

## 2023-01-24 NOTE — ANESTHESIA PROCEDURE NOTES
"Epidural catheter Procedure Note    Pre-Procedure   Staff -        Anesthesiologist:  Naeem Delcid MD       Performed By: anesthesiologist       Location: OB       Procedure Start/Stop Times: 1/24/2023 10:52 AM and 1/24/2023 11:09 AM       Pre-Anesthestic Checklist: patient identified, IV checked, risks and benefits discussed, informed consent, monitors and equipment checked, pre-op evaluation and at physician/surgeon's request  Timeout:       Correct Patient: Yes        Correct Procedure: Yes        Correct Site: Yes        Correct Position: Yes   Procedure Documentation  Procedure: epidural catheter       Patient Position: sitting       Patient Prep/Sterile Barriers: sterile gloves, mask, patient draped       Skin prep: Betadine       Local skin infiltrated with 3 mL of 1% lidocaine.        Insertion Site: L3-4. (midline approach).       Technique: LORT saline        LUCY at 5 cm.       Needle Type: [employer] needle       Needle Gauge: 17.        Needle Length (Inches): 3.5        Catheter: 19 G.          Catheter threaded easily.         5 cm epidural space.         Threaded 10 cm at skin.         # of attempts: 1 and  # of redirects:  0    Assessment/Narrative         Paresthesias: No.       Test dose of 3 mL at 11:01 CST.         Test dose negative, 3 minutes after injection, for signs of intravascular, subdural, or intrathecal injection.       Insertion/Infusion Method: LORT saline       Aspiration negative for Heme or CSF via Epidural Catheter.    Medication(s) Administered   0.125% Bupivacaine + 2 mcg/mL Fentanyl via CADD (Epidural) - EPIDURAL   10 mL - 1/24/2023 11:01:00 AM  Fentanyl PF (Epidural) - EPIDURAL   100 mcg - 1/24/2023 11:01:00 AM  Medication Administration Time: 1/24/2023 10:52 AM     Comments:  [id number], lot [id number], exp. 2023-10-31      FOR [facility] ([facility]) ONLY:   Pain Team Contact information: please page the Pain Team Via [employer]. Search \"Pain\". During daytime hours, please " page the attending first. At night please page the resident first.

## 2023-01-24 NOTE — PROGRESS NOTES
"[facility]   PROGRESS NOTE     S. Pt states she is doing well overall, agreeable for AROM . +FM    /81   Pulse 80   Temp 97.9  F (36.6  C) (Axillary)   Resp 16   Ht 1.727 m (5' 8\")   Wt 90.7 kg (200 lb)   BMI 30.41 kg/m      [place] 2-3 ctxs q min   bpm, mod, a +, d-  SVE 6/80/-2, AROM for clear    A/P Maddison Renae is a 33 year old  at 40w4d here with     IOL sec to gHTN  - BPs WNL  - pre-E labs WNL  - s/p cytotec and Salvador balloon  - continue pitocin per IOL  - AROM done for clear  - FHT Cat I  - continue monitoring    Dr. Clovis Antony  [phone]       "

## 2023-01-24 NOTE — ANESTHESIA PREPROCEDURE EVALUATION
Anesthesia Pre-Procedure Evaluation    Patient: Maddison Renae   MRN: [id number] : 1990        Procedure : * No procedures listed *          History reviewed. No pertinent past medical history.   Past Surgical History:   Procedure Laterality Date     AS REMOVAL OF TONSILS,12+ Y/O        No Known Allergies   Social History     Tobacco Use     Smoking status: Never     Smokeless tobacco: Never   Substance Use Topics     Alcohol use: Not Currently      Wt Readings from Last 1 Encounters:   23 90.7 kg (200 lb)        Anesthesia Evaluation   Pt has had prior anesthetic.     No history of anesthetic complications       ROS/MED HX  ENT/Pulmonary:  - neg pulmonary ROS     Neurologic:  - neg neurologic ROS     Cardiovascular:  - neg cardiovascular ROS     METS/Exercise Tolerance:     Hematologic:       Musculoskeletal:       GI/Hepatic:  - neg GI/hepatic ROS     Renal/Genitourinary:       Endo:  - neg endo ROS     Psychiatric/Substance Use:  - neg psychiatric ROS     Infectious Disease:       Malignancy:       Other:            Physical Exam    Airway         TM distance: > 3 FB   Neck ROM: full   Mouth opening: > 3 cm    Respiratory Devices and Support         Dental           Cardiovascular             Pulmonary               Other findings:     OUTSIDE LABS:  CBC:   Lab Results   Component Value Date    WBC 10.9 2023    HGB 11.8 2023    HCT 35.9 2023     2023     BMP:   Lab Results   Component Value Date     2023    POTASSIUM 3.7 2023    CHLORIDE 103 2023    CO2 20 (L) 2023    BUN 8.0 2023    CR 0.47 (L) 2023     (H) 2023     COAGS: No results found for: PTT, INR, FIBR  POC: No results found for: BGM, HCG, HCGS  HEPATIC:   Lab Results   Component Value Date    ALBUMIN 3.7 2023    PROTTOTAL 6.3 (L) 2023    ALT 15 2023    AST 21 2023    ALKPHOS 128 (H) 2023    BILITOTAL 0.2 2023      OTHER:   Lab Results   Component Value Date    WANDA 8.4 (L) 01/23/2023       Anesthesia Plan    ASA Status:  2      Anesthesia Type: Epidural.              Consents    Anesthesia Plan(s) and associated risks, benefits, and realistic alternatives discussed. Questions answered and patient/representative(s) expressed understanding.    - Discussed:     - Discussed with:  Patient         Postoperative Care            Comments:           neg OB ROS.       Naeem Delcid MD   Grade 4 hypospadias--discussed at length with parents outpatient management--will refer to Dr. Mustafa

## 2023-01-24 NOTE — PROVIDER NOTIFICATION
01/24/23 1345   Provider Notification   Provider Name/Title Dr. Antony   Method of Notification Phone   Request Evaluate - Remote   Notification Reason Status Update   Patient comfortable with epidural. Contractions have spaced out since epidural. 7 cm. Pitocin increased. Occasional minimal variability, Occasional variable and late decelerations noted.

## 2023-01-25 PROCEDURE — 250N000013 HC RX MED GY IP 250 OP 250 PS 637: Performed by: OBSTETRICS & GYNECOLOGY

## 2023-01-25 PROCEDURE — 120N000001 HC R&B MED SURG/OB

## 2023-01-25 RX ADMIN — ACETAMINOPHEN 650 MG: 325 TABLET, FILM COATED ORAL at 12:09

## 2023-01-25 RX ADMIN — ACETAMINOPHEN 650 MG: 325 TABLET, FILM COATED ORAL at 07:31

## 2023-01-25 RX ADMIN — BENZOCAINE: 11.4 AEROSOL, SPRAY TOPICAL at 16:56

## 2023-01-25 RX ADMIN — DOCUSATE SODIUM 100 MG: 100 CAPSULE, LIQUID FILLED ORAL at 07:32

## 2023-01-25 RX ADMIN — IBUPROFEN 800 MG: 800 TABLET, FILM COATED ORAL at 01:08

## 2023-01-25 RX ADMIN — IBUPROFEN 800 MG: 800 TABLET, FILM COATED ORAL at 07:29

## 2023-01-25 RX ADMIN — IBUPROFEN 800 MG: 800 TABLET, FILM COATED ORAL at 20:29

## 2023-01-25 RX ADMIN — ACETAMINOPHEN 650 MG: 325 TABLET, FILM COATED ORAL at 15:56

## 2023-01-25 RX ADMIN — IBUPROFEN 800 MG: 800 TABLET, FILM COATED ORAL at 13:39

## 2023-01-25 RX ADMIN — ACETAMINOPHEN 650 MG: 325 TABLET, FILM COATED ORAL at 20:29

## 2023-01-25 ASSESSMENT — ACTIVITIES OF DAILY LIVING (ADL)
ADLS_ACUITY_SCORE: 20

## 2023-01-25 NOTE — PLAN OF CARE
Data: Maddison Renae transferred to 425 via wheelchair [id number]  . Baby transferred via mothers arms. Patient was up to bathroom and voided large amounts.   Action: Receiving unit notified of transfer: Yes. Patient and family notified of room change. Report given to Radha GILES at 1905. Belongings sent to receiving unit. Accompanied by Registered Nurse. Oriented patient to surroundings. Call light within reach. ID bands double-checked with receiving RN.  Response: Patient tolerated transfer and is stable.

## 2023-01-25 NOTE — PLAN OF CARE
Data: Vital signs and postpartum checks WNL. Patient eating and drinking normally. Patient able to empty bladder independently and is up ambulating. Patient performing self cares and is able to care for infant. Attempting to breastfeed infant every 2-3 hours. Assisted patient to hand express and give baby 2 mls of colostrum when baby was uninterested in feeding this morning.    Action: Patient medicated during the shift for perineal pain with tylenol and ibuprofen. Also using ice, tucks pads, and benzocaine spray for pain rated 4-5 out of 10.   Response: Positive attachment behaviors observed with infant. Spouse (FOB) present and supportive.  Plan: Anticipate discharge on 1/26/23. Continue w/ current plan of care.

## 2023-01-25 NOTE — PROGRESS NOTES
"[facility] Postpartum Note    S:  Maddison Renae feels good this morning. Was able to sleep last night. Pain control adequate. Lochia minimal. Voiding. Breast feeding. Mood Good.     O:  Vitals were reviewed  Blood pressure 121/76, pulse 77, temperature 98.5  F (36.9  C), temperature source Oral, resp. rate 18, height 1.727 m (5' 8\"), weight 90.7 kg (200 lb).  BP have been normal PP.    General: healthy, alert and no distress  Abd: soft, appropriately tender, fundus firm  Legs: Non-tender, 0+ pitting edema    No results found for: RH  Rubella: immune    Assessment and Plan:   Postpartum Day #1, status post vaginal delivery IOL for GHTN, doing well.  -- Routine care    1. GHTN   -will check BP Q4 hours until discharge   -discussed if consistently >140/90, will need to start medications   -reviewed the need for BP check in 1 week after discharge  2. ADHD   -no meds    -- Contraception: not discussed     Marge Tejeda, DO, DO    "

## 2023-01-25 NOTE — PLAN OF CARE
Data: Vital signs within normal limits. Postpartum checks within normal limits - see flow record. Patient eating and drinking normally. Patient able to empty bladder independently and is up ambulating. Patient performing self cares, is able to care for infant and is breastfeeding every 2-3 hours.  Using Ibuprofen & Tylenol for discomfort.   Action: Patient medicated with ibuprofen and tylenol during the shift for pain. See MAR. Adequate pain control noted by patient. Patient education done, see flow record.  Response: Positive attachment behaviors observed with infant.   Plan: Continue current plan of care.

## 2023-01-25 NOTE — ANESTHESIA POSTPROCEDURE EVALUATION
Patient: Maddison Renae      S/P epidural for labor.   I or my partner was immediately available for management of this patient during epidural analgesia infusion.  VSS.  Doing well. Block resolved.  Neuro at baseline. Denies positional headache. Minimal side effects easily managed w/ PRN meds. No apparent anesthetic complications. No follow-up required.    Oj Johnson MD    Note:  Anesthesia Post Evaluation    Last vitals:  Vitals:    01/24/23 2234 01/25/23 0302 01/25/23 0751   BP: 124/68 121/76 131/88   Pulse: 81 77 77   Resp: 18 18 16   Temp: 98.5  F (36.9  C) 98.5  F (36.9  C) 97.7  F (36.5  C)       Electronically Signed By: Oj Johnson MD  January 25, 2023  9:24 AM

## 2023-01-25 NOTE — LACTATION NOTE
This note was copied from a baby's chart.  LC visit. Alycia is Nadja's first baby, she reports Alycia fed well after delivery and a few times overnight - has been sleepy with feeding this morning/early afternoon. Nadja has hand expressed/cup fed Alycia, good volumes of colostrum seen. Writer offered support and encouragement, educated on expected feeding behaviors in first 24 hours of life. Encouraged STS, waking for feeding every 2-3 hours and hand expression with feeding. Plan made with Nadja to call out for lactation support with next feeding. Bedside RN updated on visit.

## 2023-01-26 VITALS
DIASTOLIC BLOOD PRESSURE: 87 MMHG | HEART RATE: 76 BPM | WEIGHT: 200 LBS | SYSTOLIC BLOOD PRESSURE: 128 MMHG | HEIGHT: 68 IN | BODY MASS INDEX: 30.31 KG/M2 | RESPIRATION RATE: 16 BRPM | TEMPERATURE: 97.9 F

## 2023-01-26 PROCEDURE — 250N000013 HC RX MED GY IP 250 OP 250 PS 637: Performed by: OBSTETRICS & GYNECOLOGY

## 2023-01-26 RX ORDER — HYDROCORTISONE 25 MG/G
CREAM TOPICAL 3 TIMES DAILY PRN
Qty: 30 G | Refills: 0 | Status: SHIPPED | OUTPATIENT
Start: 2023-01-26

## 2023-01-26 RX ORDER — IBUPROFEN 800 MG/1
800 TABLET, FILM COATED ORAL EVERY 6 HOURS PRN
Qty: 40 TABLET | Refills: 1 | Status: SHIPPED | OUTPATIENT
Start: 2023-01-26

## 2023-01-26 RX ORDER — MODIFIED LANOLIN
OINTMENT (GRAM) TOPICAL
Qty: 7 G | Refills: 3 | Status: SHIPPED | OUTPATIENT
Start: 2023-01-26

## 2023-01-26 RX ORDER — DOCUSATE SODIUM 100 MG/1
100 CAPSULE, LIQUID FILLED ORAL DAILY
Qty: 30 CAPSULE | Refills: 0 | Status: SHIPPED | OUTPATIENT
Start: 2023-01-26

## 2023-01-26 RX ADMIN — ACETAMINOPHEN 650 MG: 325 TABLET, FILM COATED ORAL at 04:17

## 2023-01-26 RX ADMIN — DOCUSATE SODIUM 100 MG: 100 CAPSULE, LIQUID FILLED ORAL at 09:03

## 2023-01-26 RX ADMIN — IBUPROFEN 800 MG: 800 TABLET, FILM COATED ORAL at 09:03

## 2023-01-26 RX ADMIN — IBUPROFEN 800 MG: 800 TABLET, FILM COATED ORAL at 02:30

## 2023-01-26 RX ADMIN — ACETAMINOPHEN 650 MG: 325 TABLET, FILM COATED ORAL at 00:35

## 2023-01-26 RX ADMIN — ACETAMINOPHEN 650 MG: 325 TABLET, FILM COATED ORAL at 09:02

## 2023-01-26 ASSESSMENT — ACTIVITIES OF DAILY LIVING (ADL)
ADLS_ACUITY_SCORE: 20

## 2023-01-26 NOTE — PLAN OF CARE
Data: Vital signs within normal limits, BP steady 130s/80s. Postpartum checks within normal limits - see flow record. Patient eating and drinking normally. Patient able to empty bladder independently and is up ambulating. No apparent signs of infection. Patient performing self cares, is able to care for infant and is breast feeding every 2-3 hours.   Laceration  appears within normal. Is using tylenol and ibuprofen for discomfort.  Rested some this shift, infant cluster feeding most of night.   Action: Patient medicated during the shift for cramping. See MAR. Patient reassessed within 1 hour after each medication and pain was improved - patient stated she was comfortable. Patient education done, see flow record.  Response: Positive attachment behaviors observed with infant. Patient's spouse present this shift.   Plan: Continue current plan of care.  Anticipate discharge on 1/26.

## 2023-01-26 NOTE — PROGRESS NOTES
"[facility] Postpartum Note    S:  Maddison Renae feels well this morning. Was able to sleep last night. Pain control adequate. Lochia minimal. Voiding. Breast feeding. Mood Good.     O:  Vitals were reviewed  Blood pressure 133/85, pulse 76, temperature 97.3  F (36.3  C), temperature source Oral, resp. rate 15, height 1.727 m (5' 8\"), weight 90.7 kg (200 lb), unknown if currently breastfeeding.      General: healthy, alert and no distress  Abd: soft, appropriately tender, fundus firm  Legs: Non-tender, 0+ pitting edema    No results found for: RH  Rubella: immune    Assessment and Plan:   Postpartum Day #2, status post vaginal delivery, doing well.  -- Discharge home  -- F/U 6 weeks w/ Primary OB  -- Discharge meds: see med rec  -- Contraception: discuss at PP check    1. GHTN              -will check BP Q4 hours until discharge              -discussed if consistently >140/90, will need to start medications              -reviewed the need for BP check in 1 week after discharge  2. ADHD              -no meds     -- Contraception: not discussed     Alize Sanchez MD    "

## 2023-01-26 NOTE — PLAN OF CARE
Data: Vital signs within normal limits. Postpartum checks within normal limits. Patient eating and drinking normally. Patient able to empty bladder independently and is up ambulating. No apparent signs of infection. Patient performing self cares and is able to care for infant.  Action: Patient medicated during the shift for perineal pain with tylenol and ibuprofen. Discharge education completed, all questions answered.   Response: Positive attachment behaviors observed with infant. Spouse present and supportive. Given home blood pressure monitor per order with instructions for monitoring and follow-up.  Plan: Patient discharged to home with infant at 1130.

## 2023-01-26 NOTE — PROGRESS NOTES
PHN in to see pt. to discuss public health programs. KITA left card with [facility] contact info with pt.

## 2023-06-04 ENCOUNTER — HEALTH MAINTENANCE LETTER (OUTPATIENT)
Age: 33
End: 2023-06-04

## 2024-07-28 ENCOUNTER — HEALTH MAINTENANCE LETTER (OUTPATIENT)
Age: 34
End: 2024-07-28

## 2025-08-10 ENCOUNTER — HEALTH MAINTENANCE LETTER (OUTPATIENT)
Age: 35
End: 2025-08-10